# Patient Record
Sex: FEMALE | Race: WHITE
[De-identification: names, ages, dates, MRNs, and addresses within clinical notes are randomized per-mention and may not be internally consistent; named-entity substitution may affect disease eponyms.]

---

## 2021-11-21 ENCOUNTER — HOSPITAL ENCOUNTER (EMERGENCY)
Dept: HOSPITAL 4 - SED | Age: 26
Discharge: HOME | End: 2021-11-21
Payer: COMMERCIAL

## 2021-11-21 VITALS — SYSTOLIC BLOOD PRESSURE: 148 MMHG

## 2021-11-21 VITALS — HEIGHT: 62 IN | BODY MASS INDEX: 31.28 KG/M2 | WEIGHT: 170 LBS

## 2021-11-21 VITALS — SYSTOLIC BLOOD PRESSURE: 130 MMHG

## 2021-11-21 DIAGNOSIS — J45.909: ICD-10-CM
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
## 2021-11-21 DIAGNOSIS — S00.03XA: Primary | ICD-10-CM

## 2021-11-21 PROCEDURE — 99283 EMERGENCY DEPT VISIT LOW MDM: CPT

## 2022-11-03 ENCOUNTER — HOSPITAL ENCOUNTER (EMERGENCY)
Dept: HOSPITAL 4 - SED | Age: 27
Discharge: HOME | End: 2022-11-03
Payer: COMMERCIAL

## 2022-11-03 VITALS — BODY MASS INDEX: 33.13 KG/M2 | SYSTOLIC BLOOD PRESSURE: 143 MMHG | HEIGHT: 62 IN | WEIGHT: 180 LBS

## 2022-11-03 VITALS — SYSTOLIC BLOOD PRESSURE: 121 MMHG

## 2022-11-03 DIAGNOSIS — J45.909: Primary | ICD-10-CM

## 2022-11-03 DIAGNOSIS — J02.9: ICD-10-CM

## 2022-11-03 DIAGNOSIS — R05.9: ICD-10-CM

## 2022-11-03 DIAGNOSIS — R09.81: ICD-10-CM

## 2022-11-03 DIAGNOSIS — Z79.899: ICD-10-CM

## 2022-11-03 PROCEDURE — 94640 AIRWAY INHALATION TREATMENT: CPT

## 2022-11-03 PROCEDURE — 36415 COLL VENOUS BLD VENIPUNCTURE: CPT

## 2022-11-03 PROCEDURE — 71045 X-RAY EXAM CHEST 1 VIEW: CPT

## 2022-11-03 PROCEDURE — 87804 INFLUENZA ASSAY W/OPTIC: CPT

## 2022-11-03 PROCEDURE — 99284 EMERGENCY DEPT VISIT MOD MDM: CPT

## 2022-11-03 PROCEDURE — 81025 URINE PREGNANCY TEST: CPT

## 2022-11-03 NOTE — NUR
BIB by self, c/c Sore throat, patient was seen by PCP yesterday and was tested 
for Strep and / FLU, per patient both tests were negative. Patient AOx4, NAD, 
RA, +ADLs.

## 2022-11-03 NOTE — NUR
Patient given written and verbal discharge instructions and verbalizes 
understanding.  ER MD discussed with patient the results and treatment 
provided. Patient in stable condition. ID arm band removed. IV catheter removed 
intact and dressing applied, no active bleeding.

Rx given. Patient educated on pain management and to follow up with PMD. Pain 
Scale 0/10

Opportunity for questions provided and answered. Medication side effect fact 
sheet provided.